# Patient Record
Sex: FEMALE | Race: WHITE | NOT HISPANIC OR LATINO
[De-identification: names, ages, dates, MRNs, and addresses within clinical notes are randomized per-mention and may not be internally consistent; named-entity substitution may affect disease eponyms.]

---

## 2017-10-31 ENCOUNTER — RESULT REVIEW (OUTPATIENT)
Age: 57
End: 2017-10-31

## 2019-02-04 PROBLEM — Z00.00 ENCOUNTER FOR PREVENTIVE HEALTH EXAMINATION: Status: ACTIVE | Noted: 2019-02-04

## 2019-04-01 ENCOUNTER — RECORD ABSTRACTING (OUTPATIENT)
Age: 59
End: 2019-04-01

## 2019-04-01 DIAGNOSIS — Z83.3 FAMILY HISTORY OF DIABETES MELLITUS: ICD-10-CM

## 2019-04-01 DIAGNOSIS — Z82.49 FAMILY HISTORY OF ISCHEMIC HEART DISEASE AND OTHER DISEASES OF THE CIRCULATORY SYSTEM: ICD-10-CM

## 2019-04-01 DIAGNOSIS — Z83.1 FAMILY HISTORY OF OTHER INFECTIOUS AND PARASITIC DISEASES: ICD-10-CM

## 2019-04-01 DIAGNOSIS — Z83.438 FAMILY HISTORY OF OTHER DISORDER OF LIPOPROTEIN METABOLISM AND OTHER LIPIDEMIA: ICD-10-CM

## 2019-04-01 DIAGNOSIS — Z80.1 FAMILY HISTORY OF MALIGNANT NEOPLASM OF TRACHEA, BRONCHUS AND LUNG: ICD-10-CM

## 2019-04-01 LAB — CYTOLOGY CVX/VAG DOC THIN PREP: NORMAL

## 2019-06-25 ENCOUNTER — RESULT REVIEW (OUTPATIENT)
Age: 59
End: 2019-06-25

## 2019-10-31 ENCOUNTER — APPOINTMENT (OUTPATIENT)
Dept: OBGYN | Facility: CLINIC | Age: 59
End: 2019-10-31
Payer: COMMERCIAL

## 2019-10-31 VITALS
SYSTOLIC BLOOD PRESSURE: 117 MMHG | WEIGHT: 151 LBS | HEIGHT: 65 IN | DIASTOLIC BLOOD PRESSURE: 76 MMHG | BODY MASS INDEX: 25.16 KG/M2

## 2019-10-31 PROCEDURE — 99396 PREV VISIT EST AGE 40-64: CPT

## 2019-11-08 ENCOUNTER — TRANSCRIPTION ENCOUNTER (OUTPATIENT)
Age: 59
End: 2019-11-08

## 2020-08-13 ENCOUNTER — RESULT REVIEW (OUTPATIENT)
Age: 60
End: 2020-08-13

## 2020-11-04 ENCOUNTER — APPOINTMENT (OUTPATIENT)
Dept: OBGYN | Facility: CLINIC | Age: 60
End: 2020-11-04
Payer: COMMERCIAL

## 2020-11-04 VITALS
WEIGHT: 160 LBS | BODY MASS INDEX: 26.66 KG/M2 | SYSTOLIC BLOOD PRESSURE: 124 MMHG | HEIGHT: 65 IN | DIASTOLIC BLOOD PRESSURE: 78 MMHG

## 2020-11-04 PROCEDURE — 99072 ADDL SUPL MATRL&STAF TM PHE: CPT

## 2020-11-04 PROCEDURE — 99396 PREV VISIT EST AGE 40-64: CPT

## 2020-11-04 NOTE — HISTORY OF PRESENT ILLNESS
[FreeTextEntry1] : 59yo  postmenopausal without HRT here for annual GYN exam. She is in a stable monogamous relationship. She has h/o genital HSV and takes Valtrex for rare outbreaks(none in past year) .Her daughter is 22 and graduated [employer] in May, now working in [place]. Her son is  and has 2 daughters 3 and 6 years old. She denies any new health history since her last exam. She is osteoporotic at the spine per BMD 2018 and did improve her Vit D level when taking daily 2000U religiously but has since been less diligent about taking it. Has distant h/o HPV/JUAN treated with cone biopsy. Paps have been negative/HPV negative since \par  [Mammogramdate] : 8/13/20 [TextBox_19] : BIRADS 1 [PapSmeardate] : 10/31/17 [TextBox_31] : Neg/HPV Neg [BoneDensityDate] : 6/7/18 [TextBox_37] : T Score Spine -2.6 Hip -0.4 Fem Neck -1.5, [ColonoscopyDate] : 7/9/18 [TextBox_43] : Negative, 7 year recall

## 2020-11-05 LAB — HPV HIGH+LOW RISK DNA PNL CVX: NOT DETECTED

## 2020-11-09 LAB — CYTOLOGY CVX/VAG DOC THIN PREP: ABNORMAL

## 2021-09-16 ENCOUNTER — RESULT REVIEW (OUTPATIENT)
Age: 61
End: 2021-09-16

## 2021-11-08 ENCOUNTER — APPOINTMENT (OUTPATIENT)
Dept: OBGYN | Facility: CLINIC | Age: 61
End: 2021-11-08
Payer: COMMERCIAL

## 2021-11-08 VITALS
WEIGHT: 158 LBS | HEIGHT: 65 IN | SYSTOLIC BLOOD PRESSURE: 126 MMHG | BODY MASS INDEX: 26.33 KG/M2 | DIASTOLIC BLOOD PRESSURE: 78 MMHG

## 2021-11-08 PROCEDURE — 99396 PREV VISIT EST AGE 40-64: CPT

## 2021-11-08 NOTE — HISTORY OF PRESENT ILLNESS
[FreeTextEntry1] : 62yo  postmenopausal without HRT here for annual GYN exam. She is in a stable monogamous relationship. She has h/o genital HSV and takes Valtrex for rare outbreaks(none in past > year) .Her daughter is 23 and graduated [employer], now living/working in [place]( [employer]). Her son is  and has 2 daughters 4 and 7 years old. She denies any new health history since her last exam. She had a fall which injured her knees but thankfully she didn't break anything!  She is osteoporotic at the spine per BMD 2018 and did improve her Vit D level when taking daily 2000U religiously but has since been less diligent about taking it( recent Vitamin D level 27). Has distant h/o HPV/JUAN treated with cone biopsy. Paps have been negative/HPV negative. She received 2 doses Pfizer vaccine anfd will take 3rd when eligible.\par \par \par OB History\par Total pregnancies  2.  Total living children  2.  Full-term delivery  2.  NVD  2 [Mammogramdate] : 9/16/21 [TextBox_19] : BIRADS 1 [BreastSonogramDate] : 9/16/21 [TextBox_25] : BIRADS 1 [PapSmeardate] : 11/4/20 [TextBox_31] : Neg/HPV Neg [BoneDensityDate] : 6/7/18 [TextBox_37] : T Score Spine -2.6 Hip -0.4 Fem Neck -1.5,.  [ColonoscopyDate] : 7/9/18 [TextBox_43] : Negative 7 Y recall

## 2022-01-19 ENCOUNTER — APPOINTMENT (OUTPATIENT)
Dept: GASTROENTEROLOGY | Facility: CLINIC | Age: 62
End: 2022-01-19
Payer: COMMERCIAL

## 2022-01-19 ENCOUNTER — APPOINTMENT (OUTPATIENT)
Dept: NEUROLOGY | Facility: CLINIC | Age: 62
End: 2022-01-19
Payer: COMMERCIAL

## 2022-01-19 VITALS
DIASTOLIC BLOOD PRESSURE: 70 MMHG | WEIGHT: 156 LBS | TEMPERATURE: 97.6 F | HEART RATE: 80 BPM | SYSTOLIC BLOOD PRESSURE: 120 MMHG | BODY MASS INDEX: 25.99 KG/M2 | HEIGHT: 65 IN | OXYGEN SATURATION: 98 %

## 2022-01-19 PROCEDURE — 36415 COLL VENOUS BLD VENIPUNCTURE: CPT

## 2022-01-19 PROCEDURE — 99244 OFF/OP CNSLTJ NEW/EST MOD 40: CPT | Mod: 25

## 2022-01-19 PROCEDURE — 99245 OFF/OP CONSLTJ NEW/EST HI 55: CPT | Mod: GT

## 2022-01-19 RX ORDER — VALACYCLOVIR 1 G/1
1 TABLET, FILM COATED ORAL
Qty: 10 | Refills: 5 | Status: DISCONTINUED | COMMUNITY
Start: 1900-01-01 | End: 2022-01-19

## 2022-01-19 RX ORDER — NEOMYCIN AND POLYMYXIN B SULFATES AND DEXAMETHASONE 3.5; 10000; 1 MG/G; [IU]/G; MG/G
3.5-10000-0.1 OINTMENT OPHTHALMIC
Qty: 3 | Refills: 0 | Status: DISCONTINUED | COMMUNITY
Start: 2021-07-20 | End: 2022-01-19

## 2022-01-19 NOTE — CONSULT LETTER
[Dear  ___] : Dear  [unfilled], [Consult Letter:] : I had the pleasure of evaluating your patient, [unfilled]. [Please see my note below.] : Please see my note below. [Consult Closing:] : Thank you very much for allowing me to participate in the care of this patient.  If you have any questions, please do not hesitate to contact me. [Sincerely,] : Sincerely, [FreeTextEntry3] : Bandar López MD\par tel: [phone]\par fax: [phone]\par

## 2022-01-19 NOTE — HISTORY OF PRESENT ILLNESS
[de-identified] : MICHAELA PADILLA  is being evaluated at the request of Dr. Maldonado for an opinion re: abdominal pain  / diarrhea / BBPPR.  developed sudden onset lower quadrant   / severe abdominal pain on 1/15 folloed by non bloody diarrhea and  eventually bnloody diarrhea x 3.  Sxs last 24 hours. Currently without  diarrhea / BRBPR. Abdominal pain is  significantly decreased. Denies fevr, chills, melena, hematemesis. Admits to 1  episode of  bilious  emesis\par \par \par - Colonoscopy  7/2018 notable  for hemorrhoids / diverticulosis

## 2022-01-19 NOTE — ASSESSMENT
[FreeTextEntry1] : Probable  self limited ischemic  colitis:  Improved. Labs  ordered.  CAT scan ordered.  If  CT scan abnormal  or if  sxs return , will perform colonoscopy sooner than planned\par \par Pertinent available records reviewed\par \par The ordered labs/ bloods  were drawn / collected in my office\par \par

## 2022-01-20 ENCOUNTER — RESULT REVIEW (OUTPATIENT)
Age: 62
End: 2022-01-20

## 2022-01-20 LAB
ALBUMIN SERPL ELPH-MCNC: 4.5 G/DL
ALP BLD-CCNC: 85 U/L
ALT SERPL-CCNC: 34 U/L
AMYLASE/CREAT SERPL: 38 U/L
ANION GAP SERPL CALC-SCNC: 12 MMOL/L
AST SERPL-CCNC: 25 U/L
BASOPHILS # BLD AUTO: 0.05 K/UL
BASOPHILS NFR BLD AUTO: 0.6 %
BILIRUB DIRECT SERPL-MCNC: 0.2 MG/DL
BILIRUB INDIRECT SERPL-MCNC: 0.5 MG/DL
BILIRUB SERPL-MCNC: 0.7 MG/DL
BUN SERPL-MCNC: 13 MG/DL
CALCIUM SERPL-MCNC: 9.6 MG/DL
CHLORIDE SERPL-SCNC: 96 MMOL/L
CO2 SERPL-SCNC: 24 MMOL/L
CREAT SERPL-MCNC: 0.95 MG/DL
CRP SERPL-MCNC: 19 MG/L
EOSINOPHIL # BLD AUTO: 0.14 K/UL
EOSINOPHIL NFR BLD AUTO: 1.7 %
ERYTHROCYTE [SEDIMENTATION RATE] IN BLOOD BY WESTERGREN METHOD: 34 MM/HR
GLUCOSE SERPL-MCNC: 113 MG/DL
HCT VFR BLD CALC: 43.8 %
HGB BLD-MCNC: 14.6 G/DL
IMM GRANULOCYTES NFR BLD AUTO: 0.4 %
LPL SERPL-CCNC: 29 U/L
LYMPHOCYTES # BLD AUTO: 2.05 K/UL
LYMPHOCYTES NFR BLD AUTO: 24.6 %
MAN DIFF?: NORMAL
MCHC RBC-ENTMCNC: 31.1 PG
MCHC RBC-ENTMCNC: 33.3 GM/DL
MCV RBC AUTO: 93.4 FL
MONOCYTES # BLD AUTO: 0.68 K/UL
MONOCYTES NFR BLD AUTO: 8.2 %
NEUTROPHILS # BLD AUTO: 5.39 K/UL
NEUTROPHILS NFR BLD AUTO: 64.5 %
PLATELET # BLD AUTO: 242 K/UL
POTASSIUM SERPL-SCNC: 4.2 MMOL/L
PROT SERPL-MCNC: 7.7 G/DL
RBC # BLD: 4.69 M/UL
RBC # FLD: 12.7 %
SODIUM SERPL-SCNC: 133 MMOL/L
WBC # FLD AUTO: 8.34 K/UL

## 2022-01-20 RX ORDER — DOXYCYCLINE 150 MG/1
CAPSULE ORAL
Refills: 0 | Status: DISCONTINUED | COMMUNITY
End: 2022-01-20

## 2022-01-20 RX ORDER — DOXYCYCLINE HYCLATE 100 MG/1
100 TABLET ORAL
Qty: 30 | Refills: 0 | Status: DISCONTINUED | COMMUNITY
Start: 2020-11-20 | End: 2022-01-20

## 2022-01-20 NOTE — ASSESSMENT
[FreeTextEntry1] : She needs imaging as outlined below.\par MRI brain and IACs- for persistent balance problems and tinnitus.\par MRI cervical spine to evaluate for cord compression as a possible etiology of her symptoms.\par Paresthesias need to be evaluated via EMG/NCV (one arm, one leg).\par \par I would like her to get VNG and Vestibular therapy. consider ENT evaluation.\par \par Recommend daily walking 30 minutes at least.\par Hydration 64 ounces. \par \par Will hold off further blood work until we receive the most recent one.\par \par Further recommendations based on test results.

## 2022-01-20 NOTE — HISTORY OF PRESENT ILLNESS
[Home] : at home, [unfilled] , at the time of the visit. [Medical Office: ([facility])___] : at the medical office located in  [Verbal consent obtained from patient] : the patient, [unfilled] [FreeTextEntry1] : Dary is a 62 y/o woan who is being seen in neurologic consultation for balance problems. Ongoing since 2013.\par She notes symptoms when she lays down at night. If she sits up too fast and turns her head from sided o side- she feels off balance. The room doesn't spin. She notes sensation to be as if she is on a boat or the subway- its a sensation of her swaying- this leads her to lose confidence in her walking aility.\par \par She notes high pitched ringing in her left ear. Feels a sensation of "hair on the cheek." Saw Dr. Scott in 2015 and had hearing test and noted to have hearing loss in left ear. MRI was normal at that time. She now notes dimas pitched ringing in her left ear.\par \par Notes bilateral feet tingling. Notes numbness in the feet. Calves cramp especially in the evening.\par \par History of ocular migraines.\par Now notes delayed opening only in the left eye.\par Note exercising well.\par Feels diet is healthy.\par \par Recent bw was normal except for low vitamin D. TG were elevated.\par \par +Neck pain, no weakness; no  no urinary complaints. X-ray is normal.

## 2022-01-20 NOTE — PHYSICAL EXAM
[FreeTextEntry1] : Physical examination \par General: No acute distress, Awake, Alert. \par \par Mental status \par Awake, alert, and oriented to person, time and place, Normal attention span and concentration, Recent and remote memory intact, Language intact, Fund of knowledge intact. \par Cranial Nerves \par II: VFF \par III, IV, VI: PERRL, EOMI. \par V: Facial sensation is normal B/L. \par VII: Facial strength is normal B/L. \par VIII: Gross hearing is intact. \par \par Motor exam \par normal\par \par Gait \par Normal; romberg positive.\par

## 2022-02-04 ENCOUNTER — NON-APPOINTMENT (OUTPATIENT)
Age: 62
End: 2022-02-04

## 2022-06-17 ENCOUNTER — APPOINTMENT (OUTPATIENT)
Dept: CARDIOLOGY | Facility: CLINIC | Age: 62
End: 2022-06-17

## 2022-06-28 ENCOUNTER — RESULT REVIEW (OUTPATIENT)
Age: 62
End: 2022-06-28

## 2022-07-21 ENCOUNTER — APPOINTMENT (OUTPATIENT)
Dept: NEUROLOGY | Facility: CLINIC | Age: 62
End: 2022-07-21

## 2022-07-21 PROCEDURE — 99214 OFFICE O/P EST MOD 30 MIN: CPT | Mod: 95

## 2022-07-21 NOTE — DATA REVIEWED
[de-identified] : \par  MRI Report             Final\par \par No Documents Attached\par \par \par \par \par   [facility]\par                                          [address]\par                                    [place], [place]  [address]\par                                        Department of Radiology\par                                             [phone]\par \par \par Patient Name:      MICHAELA PADILLA                Location:       [facility]\par Med Rec #:        [id number]                    Account #:      [id number]\par YOB: 1960                    Ordering:       Ebony Muhammad MD\par Age: 62               Sex:    F                 Attending:      Ebony Muhammad MD\par PCP:        Liz Mar MD\par ______________________________________________________________________________________\par \par Exam Date:      06/28/22\par Exam:         MRI BRAIN\par Order#:       MRI [id number]\par \par \par \par EXAM: MRI brain without contrast\par \par  INDICATION: Nonspecific neurologic symptoms. Tinnitus. Hearing loss\par \par  TECHNIQUE: MR examination of brain performed without contrast with special attention to the\par internal auditory canals (IAC) utilizing axial diffusion-weighted/ADC, axial T2 FLAIR, sagittal T2\par FLAIR, coronal T2 FLAIR, axial T1, sagittal T1, coronal T1, axial, axial/coronal T1 of IAC,\par axial/coronal T2 IAC, susceptibility weighted sequences.\par \par  Please note that although this exam was originally ordered with contrast, the patient requested no\par contrast for this exam\par \par  COMPARISON: March 25, 2015 MRI brain\par \par  FINDINGS:\par \par  Ventricles and sulci are mildly proportionately prominent likely related to mild parenchymal volume\par loss.  . No hydrocephalus or extra-axial fluid collection.\par \par  No abnormal restricted diffusion identified to suggest acute territorial infarction. No acute\par intracranial hemorrhage. Mild subcortical and periventricular-white matter T2-weighted\par hyperintensity, nonspecific but favored to reflect sequela of chronic mild microvascular\par ischemia.etiologies. No significant midline shift, mass effect or herniation. Susceptibility\par weighted sequences are within normal limits without evidence for chronic blood products.\par  Visualized proximal arterial and dural venous sinus flow voids preserved on spin-echo sequences.\par Mastoid air cells are well aerated.. Moderate severe mucosal thickening of anterior ethmoid air\par cells, sphenoid and maxillary sinuses with air fluid level present within right maxillary sinus.\par \par  No suspicious expansile or destructive calvarial lesion identified.\par \par  Sella and suprasellar region are unremarkable.\par \par  No space-occupying lesion, abnormal signal identified within the internal auditory canals and\par bilateral cerebellopontine angle cisterns.\par  Inner ear structures demonstrate normal T2-weighted signal bilaterally. Meckel caves and cavernous\par sinuses are within normal limits.\par \par \par \par  IMPRESSION:\par \par  No acute intracranial hemorrhage, acute infarction, extra-axial fluid collection or hydrocephalus.\par  No space-occupying lesion, no abnormal signal identified within the internal auditory canals and\par bilateral cerebellopontine angle cisterns.\par \par  If there are questions/need for clarification regarding this report, Dr. Gilberto Jose can be best\par reached via the patient secure hospital email system at [email or web address].\par \par  --- End of Report ---\par \par ***Electronically Signed ***\par -----------------------------------------------\par Gilberto Almonte MD              06/29/22 0523\par \par Dictated on 06/29/22\par \par \par Report cc:  Ebony Muhammad MD;\par \par  \par \par  Ordered by: EBONY MUHAMMAD       Collected/Examined: 28Jun2022 01:04PM       \par Verified by: EBONY MUHAMMAD 29Jun2022 10:26AM       \par  Result Communication: No patient communication needed at this time;\par Stage: Final       \par  Performed at: [facility]       Resulted: [id number] 05:23AM       Last Updated: 29Jun2022 10:26AM       Accession: [id number]        [de-identified] : \par  MRI Report             Final\par \par No Documents Attached\par \par \par \par \par   [facility]\par                                          [address]\par                                    [place], [place]  [address]\par                                        Department of Radiology\par                                             [phone]\par \par \par Patient Name:      MICHAELA PADILLA                Location:       [facility]\par Med Rec #:        [id number]                    Account #:      [id number]\par YOB: 1960                    Ordering:       Ebony Muhammad MD\par Age: 62               Sex:    F                 Attending:      Ebony Muhammad MD\par PCP:        Liz Mar MD\par ______________________________________________________________________________________\par \par Exam Date:      06/28/22\par Exam:         MRI CERVICAL SPINE\par Order#:       MRI [id number]\par \par \par \par EXAM: MR cervical spine without contrast\par \par  INDICATION: Neck pain.\par \par  TECHNIQUE: MR examination of cervical spine performed without contrast utilizing sagittal T2,\par sagittal T1, sagittal T2 STIR, axial T1, axial T2 and axial gradient echo sequences.\par \par  COMPARISON: None available\par \par  FINDINGS:\par \par  Reversal of cervical lordosis centered at C5-C6 which may be secondary to positioning or spasm.\par Cervical vertebral body heights are maintained. No significant spondylolisthesis.. Cervical vertebr\par al body bone marrow signal within normal limits. There are multilevel degenerative endplate changes\par with osteophyte formation, intervertebral disc space narrowing/desiccation, Schmorl's nodes and\par endplate irregularity. . No suspicious expansile or destructive osseous lesion identified.\par Paraspinal soft tissues are within normal limits. Visualized lung apices are unremarkable.\par Visualized vertebral artery flow voids are preserved. No abnormal cord signal identified. No\par significant periarticular or paraspinal soft tissue edema identified. Partially imaged moderate\par opacification of the sphenoid sinus\par \par  There are multilevel findings as follows:\par \par \par \par  Craniocervical junction unremarkable.\par \par  C2-C3: No significant canal or foraminal stenosis.\par \par  C3-C4: No significant canal or foraminal stenosis\par \par  C4-C5: Posterior osteophytic ridging with disc bulging and bilateral uncovertebral/facet\par hypertrophy contributing to mild canal stenosis and mild bilateral foraminal stenosis.\par \par  C5-C6: Posterior osteophytic ridging with disc bulging and bilateral uncovertebral/facet\par hypertrophy contributing to mild canal stenosis and moderate to severe bilateral foraminal stenosis,\par left greater than right.\par \par  C6-C7: Posterior osteophytic ridging with disc bulging and bilateral uncovertebral/facet\par hypertrophy contributing to no significant canal stenosis and mild bilateral foraminal stenosis.\par \par  C7-T1: No significant canal or foraminal stenosis.\par \par \par \par \par \par  IMPRESSION:\par \par  Multilevel cervical spondylitic changes as detailed above most notable at the C5-6 level where\par there is mild canal stenosis and moderate to severe bilateral foraminal stenosis, left greater than\par right\par \par  If there are questions/need for clarification regarding this report, Dr. Gilberto Jose can be best\par reached via the patient secure hospital [employer] system at [email or web address].\par \par  --- End of Report ---\par \par ***Electronically Signed ***\par -----------------------------------------------\par Gilberto Almonte MD              06/29/22 0539\par \par Dictated on 06/29/22\par \par \par Report cc:  Ebony Muhammad MD;\par \par  \par \par  Ordered by: EBONY MUHAMMAD       Collected/Examined: 28Jun2022 01:02PM       \par Verified by: EBONY MUHAMMAD 29Jun2022 10:26AM       \par  Result Communication: No patient communication needed at this time;\par Stage: Final       \par  Performed at: [facility]       Resulted: 29Jun2022 05:39AM       Last Updated: 29Jun2022 10:26AM       Accession: [id number]

## 2022-07-21 NOTE — HISTORY OF PRESENT ILLNESS
[Home] : at home, [unfilled] , at the time of the visit. [Medical Office: ([facility])___] : at the medical office located in  [Verbal consent obtained from patient] : the patient, [unfilled] [FreeTextEntry1] : Patient is doing a telemetry follow-up.  Since last visit she underwent a lots of health issues.\par Developed ischemic colitis-in January.\par She did see ENT Dr. Carmichael - hearing test - asym hearing loss- sl increased. tinnitus left ear\par She had COVID also.  \par \par At present, she does continue to have feelings of being off balance.\par She has neck stiffness.\par \par 1/19/22\par Dary is a 62 y/o woman who is being seen in neurologic consultation for balance problems. Ongoing since 2013.\par She notes symptoms when she lays down at night. If she sits up too fast and turns her head from sided o side- she feels off balance. The room doesn't spin. She notes sensation to be as if she is on a boat or the subway- its a sensation of her swaying- this leads her to lose confidence in her walking ability.\par \par She notes high pitched ringing in her left ear. Feels a sensation of "hair on the cheek." Saw Dr. Scott in 2015 and had hearing test and noted to have hearing loss in left ear. MRI was normal at that time. She now notes high pitched ringing in her left ear.\par \par Notes bilateral feet tingling. Notes numbness in the feet. Calves cramp especially in the evening.\par \par History of ocular migraines.\par Now notes delayed opening only in the left eye.\par Note exercising well.\par Feels diet is healthy.\par \par Recent bw was normal except for low vitamin D. TG were elevated.\par \par +Neck pain, no weakness; no  no urinary complaints. X-ray is normal.

## 2022-07-21 NOTE — ASSESSMENT
[FreeTextEntry1] : Reviewed imaging which included MRI of the brain and cervical spine.\par Encouraged her to begin walking.\par Maintain hydration.\par \par Recommend balance therapy and therapy for the neck pain which is secondary to degenerative changes.\par \par She prefers to hold off on the EMG/NCV at the moment.\par \par

## 2022-07-21 NOTE — PHYSICAL EXAM
[FreeTextEntry1] : Physical examination \par General: No acute distress, Awake, Alert. \par \par Mental status \par Awake, alert, and oriented to person, time and place, Normal attention span and concentration, Recent and remote memory intact, Language intact, Fund of knowledge intact. \par Cranial Nerves \par II: VFF \par III, IV, VI: PERRL, EOMI. \par V: Facial sensation is normal B/L. \par VII: Facial strength is normal B/L. \par VIII: Gross hearing is intact. \par \par Motor exam \par normal\par

## 2022-09-12 ENCOUNTER — RESULT REVIEW (OUTPATIENT)
Age: 62
End: 2022-09-12

## 2022-09-20 ENCOUNTER — RESULT REVIEW (OUTPATIENT)
Age: 62
End: 2022-09-20

## 2022-11-02 ENCOUNTER — APPOINTMENT (OUTPATIENT)
Dept: NEUROLOGY | Facility: CLINIC | Age: 62
End: 2022-11-02

## 2022-11-09 ENCOUNTER — APPOINTMENT (OUTPATIENT)
Dept: OBGYN | Facility: CLINIC | Age: 62
End: 2022-11-09

## 2022-11-09 VITALS — WEIGHT: 158 LBS | BODY MASS INDEX: 26.33 KG/M2 | HEIGHT: 65 IN

## 2022-11-10 ENCOUNTER — APPOINTMENT (OUTPATIENT)
Dept: OBGYN | Facility: CLINIC | Age: 62
End: 2022-11-10

## 2022-11-10 VITALS — WEIGHT: 158 LBS | HEIGHT: 65 IN | BODY MASS INDEX: 26.33 KG/M2

## 2022-11-10 PROCEDURE — 99396 PREV VISIT EST AGE 40-64: CPT

## 2022-11-10 RX ORDER — COVID-19 MOLECULAR TEST ASSAY
KIT MISCELLANEOUS
Qty: 4 | Refills: 0 | Status: COMPLETED | COMMUNITY
Start: 2022-06-30

## 2022-11-10 NOTE — HISTORY OF PRESENT ILLNESS
[FreeTextEntry1] : 61yo  postmenopausal without HRT here for annual GYN exam. She is in a stable monogamous relationship. She has h/o genital HSV and takes Valtrex for rare outbreaks(none in past > year) .Her daughter is 24 and graduated [employer], still living/working in [place]( [employer]). Her son is  and has 2 daughters 5 and 8 years old. She had an episode of severe colitis in January, saw Dr. López but no etiology identified. She is osteoporotic at the spine per BMD   with a slight decrease on BMD . She did improve her Vit D level  taking daily 2000U religiously(32). She has an appointment with Dr. Rand in February for treatment consultation. Has distant h/o HPV/JUAN treated with cone biopsy. Paps have been negative/HPV negative.  [Mammogramdate] : 9/20/22 [TextBox_19] : BIRADS 1D [BreastSonogramDate] : 9/20/22 [TextBox_25] : BIRADS 1D [PapSmeardate] : 11/4/20 [TextBox_31] : Neg/HPV Neg [BoneDensityDate] : 9/12/22 [TextBox_37] : T Score Spine -3.0 Hip @ Fem Neck -1.7 [ColonoscopyDate] : 7/9/18 [TextBox_43] : Negative 7 year recall

## 2023-02-01 ENCOUNTER — NON-APPOINTMENT (OUTPATIENT)
Age: 63
End: 2023-02-01

## 2023-02-01 DIAGNOSIS — H91.92 UNSPECIFIED HEARING LOSS, LEFT EAR: ICD-10-CM

## 2023-02-01 DIAGNOSIS — K57.90 DIVERTICULOSIS OF INTESTINE, PART UNSPECIFIED, W/OUT PERFORATION OR ABSCESS W/OUT BLEEDING: ICD-10-CM

## 2023-02-01 DIAGNOSIS — M54.2 CERVICALGIA: ICD-10-CM

## 2023-02-01 DIAGNOSIS — H93.19 TINNITUS, UNSPECIFIED EAR: ICD-10-CM

## 2023-02-01 DIAGNOSIS — G89.29 CERVICALGIA: ICD-10-CM

## 2023-02-01 DIAGNOSIS — L71.9 ROSACEA, UNSPECIFIED: ICD-10-CM

## 2023-02-01 DIAGNOSIS — R26.89 OTHER ABNORMALITIES OF GAIT AND MOBILITY: ICD-10-CM

## 2023-02-01 DIAGNOSIS — R92.2 INCONCLUSIVE MAMMOGRAM: ICD-10-CM

## 2023-02-01 DIAGNOSIS — K64.8 OTHER HEMORRHOIDS: ICD-10-CM

## 2023-02-02 ENCOUNTER — NON-APPOINTMENT (OUTPATIENT)
Age: 63
End: 2023-02-02

## 2023-02-02 ENCOUNTER — APPOINTMENT (OUTPATIENT)
Dept: CARDIOLOGY | Facility: CLINIC | Age: 63
End: 2023-02-02
Payer: COMMERCIAL

## 2023-02-02 VITALS
SYSTOLIC BLOOD PRESSURE: 130 MMHG | BODY MASS INDEX: 26.33 KG/M2 | HEIGHT: 65 IN | WEIGHT: 158 LBS | DIASTOLIC BLOOD PRESSURE: 82 MMHG

## 2023-02-02 PROCEDURE — 93000 ELECTROCARDIOGRAM COMPLETE: CPT

## 2023-02-02 PROCEDURE — 99205 OFFICE O/P NEW HI 60 MIN: CPT | Mod: 25

## 2023-02-02 NOTE — HISTORY OF PRESENT ILLNESS
[FreeTextEntry1] : Ms Crowe was first seen in 2002 for palpitations, has not been seen since 2018..\par \par She has not been hospitalized since 2018, however she was treated for colitis, vertigo, asymmetric hearing loss.\par \par She gets a pain under her left breast, radiates around to her back usually postprandial relieved by burping about once a month.\par She denies dyspnea.\par She has palpitations several times a week, like a flutter or delay.\par She denies syncope. She felt presyncopal in December. \par \par She is active in daily life, she works from home for [employer].

## 2023-02-14 ENCOUNTER — APPOINTMENT (OUTPATIENT)
Dept: ENDOCRINOLOGY | Facility: CLINIC | Age: 63
End: 2023-02-14
Payer: COMMERCIAL

## 2023-02-14 VITALS
SYSTOLIC BLOOD PRESSURE: 130 MMHG | DIASTOLIC BLOOD PRESSURE: 82 MMHG | HEART RATE: 76 BPM | WEIGHT: 158 LBS | BODY MASS INDEX: 24.8 KG/M2 | OXYGEN SATURATION: 99 % | HEIGHT: 67 IN

## 2023-02-14 DIAGNOSIS — M81.0 AGE-RELATED OSTEOPOROSIS W/OUT CURRENT PATHOLOGICAL FRACTURE: ICD-10-CM

## 2023-02-14 PROCEDURE — 99204 OFFICE O/P NEW MOD 45 MIN: CPT

## 2023-02-14 PROCEDURE — G0444 DEPRESSION SCREEN ANNUAL: CPT

## 2023-02-22 ENCOUNTER — APPOINTMENT (OUTPATIENT)
Dept: CARDIOLOGY | Facility: CLINIC | Age: 63
End: 2023-02-22
Payer: COMMERCIAL

## 2023-02-22 ENCOUNTER — TRANSCRIPTION ENCOUNTER (OUTPATIENT)
Age: 63
End: 2023-02-22

## 2023-02-22 PROCEDURE — 36415 COLL VENOUS BLD VENIPUNCTURE: CPT

## 2023-02-22 PROCEDURE — 93306 TTE W/DOPPLER COMPLETE: CPT

## 2023-02-23 LAB
ANION GAP SERPL CALC-SCNC: 12 MMOL/L
BUN SERPL-MCNC: 16 MG/DL
CALCIUM SERPL-MCNC: 9.6 MG/DL
CHLORIDE SERPL-SCNC: 101 MMOL/L
CO2 SERPL-SCNC: 26 MMOL/L
CREAT SERPL-MCNC: 0.77 MG/DL
EGFR: 87 ML/MIN/1.73M2
GLUCOSE SERPL-MCNC: 79 MG/DL
POTASSIUM SERPL-SCNC: 4.5 MMOL/L
SODIUM SERPL-SCNC: 139 MMOL/L

## 2023-02-27 ENCOUNTER — RESULT REVIEW (OUTPATIENT)
Age: 63
End: 2023-02-27

## 2023-03-01 ENCOUNTER — APPOINTMENT (OUTPATIENT)
Dept: CARDIOLOGY | Facility: CLINIC | Age: 63
End: 2023-03-01
Payer: COMMERCIAL

## 2023-03-01 PROCEDURE — 93248 EXT ECG>7D<15D REV&INTERPJ: CPT

## 2023-03-01 PROCEDURE — 99442: CPT

## 2023-03-01 RX ORDER — ATENOLOL 25 MG/1
25 TABLET ORAL DAILY
Refills: 0 | Status: DISCONTINUED | COMMUNITY
End: 2023-03-01

## 2023-03-02 ENCOUNTER — APPOINTMENT (OUTPATIENT)
Dept: CARDIOLOGY | Facility: CLINIC | Age: 63
End: 2023-03-02
Payer: COMMERCIAL

## 2023-03-02 PROCEDURE — 99442: CPT

## 2023-03-05 ENCOUNTER — RESULT REVIEW (OUTPATIENT)
Age: 63
End: 2023-03-05

## 2023-03-06 ENCOUNTER — TRANSCRIPTION ENCOUNTER (OUTPATIENT)
Age: 63
End: 2023-03-06

## 2023-03-16 ENCOUNTER — APPOINTMENT (OUTPATIENT)
Dept: CARDIOLOGY | Facility: CLINIC | Age: 63
End: 2023-03-16
Payer: COMMERCIAL

## 2023-03-16 ENCOUNTER — NON-APPOINTMENT (OUTPATIENT)
Age: 63
End: 2023-03-16

## 2023-03-16 VITALS
TEMPERATURE: 97.7 F | DIASTOLIC BLOOD PRESSURE: 80 MMHG | BODY MASS INDEX: 24.8 KG/M2 | SYSTOLIC BLOOD PRESSURE: 130 MMHG | OXYGEN SATURATION: 99 % | WEIGHT: 158 LBS | HEIGHT: 67 IN | HEART RATE: 81 BPM

## 2023-03-16 VITALS — SYSTOLIC BLOOD PRESSURE: 132 MMHG | DIASTOLIC BLOOD PRESSURE: 80 MMHG

## 2023-03-16 PROCEDURE — 99214 OFFICE O/P EST MOD 30 MIN: CPT | Mod: 25

## 2023-03-16 PROCEDURE — 93246 EXT ECG>7D<15D RECORDING: CPT

## 2023-03-16 PROCEDURE — 93000 ELECTROCARDIOGRAM COMPLETE: CPT | Mod: 59

## 2023-03-16 RX ORDER — ROSUVASTATIN CALCIUM 10 MG/1
10 TABLET, FILM COATED ORAL DAILY
Qty: 90 | Refills: 3 | Status: DISCONTINUED | COMMUNITY
Start: 2023-03-01 | End: 2023-03-16

## 2023-03-16 NOTE — REASON FOR VISIT
[FreeTextEntry1] : Zio 2/2-2/16/23 Sinus. 5 pauses, longest 3.6 seconds. High degree Av block. Atenolol was discontinued. \par \par Dary Crowe presents for follow up visit and application of 14 day event monitor while off atenolol. \par \par She reports feeling well. Ms. Crowe denies chest pain, SOB, palpitations, dizziness or syncope.

## 2023-03-16 NOTE — CARDIOLOGY SUMMARY
[de-identified] : 2/1/23-nsr [de-identified] : 2018- negative [de-identified] : 2/22/23 LVEF 63%. Mild MR. Mild AI. [de-identified] : 2/28/23 Calcium score zero [de-identified] : Telly 2/2-2/16/23 Sinus. 5 pauses, longest 3.6 seconds. High degree Av block [de-identified] : Carotid doppler 3/6/23 Normal

## 2023-03-16 NOTE — DISCUSSION/SUMMARY
[Patient] : the patient [FreeTextEntry3] : to be determined after Zio. She has an appointment with Dr. Pang on 2/7/2024 [EKG obtained to assist in diagnosis and management of assessed problem(s)] : EKG obtained to assist in diagnosis and management of assessed problem(s)

## 2023-03-16 NOTE — REVIEW OF SYSTEMS
[Fever] : no fever [Headache] : no headache [Chills] : no chills [Feeling Fatigued] : not feeling fatigued [SOB] : no shortness of breath [Dyspnea on exertion] : not dyspnea during exertion [Chest Discomfort] : no chest discomfort [Lower Ext Edema] : no extremity edema [Palpitations] : no palpitations [Syncope] : no syncope [Cough] : no cough [Dizziness] : no dizziness [Confusion] : no confusion was observed [Easy Bruising] : no tendency for easy bruising

## 2023-03-26 PROBLEM — M81.0 OSTEOPOROSIS: Status: ACTIVE | Noted: 2019-04-01

## 2023-03-26 NOTE — CONSULT LETTER
[Dear  ___] : Dear  [unfilled], [Consult Letter:] : I had the pleasure of evaluating your patient, [unfilled]. [Please see my note below.] : Please see my note below. [Sincerely,] : Sincerely, [FreeTextEntry3] : Sincerely,\par \par KIRSTEN PASCUAL MD\par [facility]\par [facility]\par \par

## 2023-03-26 NOTE — REVIEW OF SYSTEMS
[Fatigue] : fatigue [Palpitations] : palpitations [Negative] : Heme/Lymph [FreeTextEntry5] : was told PVC has FHX early CAD 64 mother

## 2023-03-26 NOTE — HISTORY OF PRESENT ILLNESS
[Vitamin D (oral)] : Vitamin D orally [Regular Dental Follow-Up] : regular dental follow-up [Family History of Breast Cancer] : family history of breast cancer [Family History of Hip Fracture] : family history of hip fracture [Family History of Osteoporosis] : family history of osteoporosis [History of Blood Clots] : history of blood clots [High Fall Risk] : high fall risk [FreeTextEntry1] : Ms. MICHAELA PADILLA is a 63 year old female sent in a consult by Dr Pang for osteoporosis \par \par maternal cousin hyperparathyroid surgery  [Low Calcium Intake] : no low calcium intake [Low Vit D Intake/Exposure] : no low vitamin D intake [Premat. Menopause (natural)] : no history of premature menopause [Amenorrhea] : no amenorrhea [Disordered Eating] : no history of disordered eating [Taking Steroids] : no history of taking steroids [Hyperparathyroidism] : no history of hyperparathyroidism [Diabetes] : no history of diabetes [Kidney Stones] : no history of kidney stones [Excessive Alcohol Intake] : no excessive alcohol intake [Excessive Soda] : no excessive soda [Current Smoking___(ppd)] : not currently smoking [Previous Fragility Fracture] : no previous fragility fracture [History of Radiation Therapy] : no history of radiation therapy [Frequent Falls] : no frequent falls [Uses a Walker/Cane] : no use of a walker/cane [Loss of Height] : no loss of height

## 2023-03-26 NOTE — ASSESSMENT
[0] : 2) Feeling down, depressed, or hopeless: Not at all (0) [PHQ-2 Negative - No further assessment needed] : PHQ-2 Negative - No further assessment needed [VHC4Eitgx] : 0

## 2023-04-10 ENCOUNTER — NON-APPOINTMENT (OUTPATIENT)
Age: 63
End: 2023-04-10

## 2023-04-10 ENCOUNTER — APPOINTMENT (OUTPATIENT)
Dept: CARDIOLOGY | Facility: CLINIC | Age: 63
End: 2023-04-10
Payer: COMMERCIAL

## 2023-04-10 VITALS — SYSTOLIC BLOOD PRESSURE: 190 MMHG | DIASTOLIC BLOOD PRESSURE: 86 MMHG

## 2023-04-10 VITALS
HEIGHT: 67 IN | BODY MASS INDEX: 25.58 KG/M2 | SYSTOLIC BLOOD PRESSURE: 158 MMHG | DIASTOLIC BLOOD PRESSURE: 82 MMHG | WEIGHT: 163 LBS

## 2023-04-10 PROCEDURE — 99058 OFFICE EMERGENCY CARE: CPT

## 2023-04-10 PROCEDURE — 99215 OFFICE O/P EST HI 40 MIN: CPT | Mod: 25

## 2023-04-10 PROCEDURE — 93000 ELECTROCARDIOGRAM COMPLETE: CPT

## 2023-04-10 NOTE — CARDIOLOGY SUMMARY
[de-identified] : 4/10/23-nsr [de-identified] : 2018- negative [de-identified] : 2/22/23- ef 63%, mild mr, mild ai [de-identified] : 2/27/23- CTA-zero, minimal mid ald [de-identified] : 3/6/23- no plaque

## 2023-04-10 NOTE — HISTORY OF PRESENT ILLNESS
[FreeTextEntry1] : Ms Crowe was first seen in 2002 for palpitations, has not been seen since 2018..\par \par She has not been hospitalized since 2018, however she was treated for colitis, vertigo, asymmetric hearing loss.\par \par She denies chest pain, dyspnea, palpitations or syncope. Sometimes she feels that cannot get a deep enough breath. Her palpitations are daily but decreased.\par \par Urgent visit today:\par This morning while at rest, she felt "the room flipped upside down" and she felt momentary tunnel vision, lasted 3-4 seconds.  This is now occurring with change in position worse when supine.\par \par She is active in daily life, she works from home for [employer].

## 2023-04-15 ENCOUNTER — NON-APPOINTMENT (OUTPATIENT)
Age: 63
End: 2023-04-15

## 2023-06-27 ENCOUNTER — APPOINTMENT (OUTPATIENT)
Dept: CARDIOLOGY | Facility: CLINIC | Age: 63
End: 2023-06-27

## 2023-08-28 ENCOUNTER — APPOINTMENT (OUTPATIENT)
Dept: ENDOCRINOLOGY | Facility: CLINIC | Age: 63
End: 2023-08-28

## 2023-09-21 ENCOUNTER — RESULT REVIEW (OUTPATIENT)
Age: 63
End: 2023-09-21

## 2023-09-26 ENCOUNTER — LABORATORY RESULT (OUTPATIENT)
Age: 63
End: 2023-09-26

## 2023-09-26 ENCOUNTER — NON-APPOINTMENT (OUTPATIENT)
Age: 63
End: 2023-09-26

## 2023-09-26 ENCOUNTER — APPOINTMENT (OUTPATIENT)
Dept: CARDIOLOGY | Facility: CLINIC | Age: 63
End: 2023-09-26
Payer: COMMERCIAL

## 2023-09-26 VITALS
HEART RATE: 88 BPM | SYSTOLIC BLOOD PRESSURE: 125 MMHG | WEIGHT: 154.56 LBS | BODY MASS INDEX: 24.26 KG/M2 | HEIGHT: 67 IN | OXYGEN SATURATION: 100 % | DIASTOLIC BLOOD PRESSURE: 80 MMHG

## 2023-09-26 VITALS — SYSTOLIC BLOOD PRESSURE: 120 MMHG | HEART RATE: 84 BPM | DIASTOLIC BLOOD PRESSURE: 80 MMHG

## 2023-09-26 PROCEDURE — 93000 ELECTROCARDIOGRAM COMPLETE: CPT | Mod: 59

## 2023-09-26 PROCEDURE — 99214 OFFICE O/P EST MOD 30 MIN: CPT | Mod: 25

## 2023-09-26 PROCEDURE — 93246 EXT ECG>7D<15D RECORDING: CPT

## 2023-09-26 RX ORDER — ADHESIVE TAPE 3"X 2.3 YD
50 MCG TAPE, NON-MEDICATED TOPICAL
Qty: 90 | Refills: 3 | Status: ACTIVE | COMMUNITY
Start: 2023-09-26

## 2023-09-27 LAB
ALBUMIN SERPL ELPH-MCNC: 4.6 G/DL
ALP BLD-CCNC: 113 U/L
ALT SERPL-CCNC: 32 U/L
AST SERPL-CCNC: 29 U/L
BILIRUB DIRECT SERPL-MCNC: 0.2 MG/DL
BILIRUB INDIRECT SERPL-MCNC: 0.6 MG/DL
BILIRUB SERPL-MCNC: 0.8 MG/DL
CHOLEST SERPL-MCNC: 155 MG/DL
HDLC SERPL-MCNC: 54 MG/DL
LDLC SERPL CALC-MCNC: 85 MG/DL
NONHDLC SERPL-MCNC: 101 MG/DL
PROT SERPL-MCNC: 7.7 G/DL
TRIGL SERPL-MCNC: 84 MG/DL

## 2023-09-28 ENCOUNTER — TRANSCRIPTION ENCOUNTER (OUTPATIENT)
Age: 63
End: 2023-09-28

## 2023-10-26 PROCEDURE — 93248 EXT ECG>7D<15D REV&INTERPJ: CPT

## 2023-11-15 ENCOUNTER — APPOINTMENT (OUTPATIENT)
Dept: OBGYN | Facility: CLINIC | Age: 63
End: 2023-11-15
Payer: COMMERCIAL

## 2023-11-15 VITALS — WEIGHT: 155 LBS | HEIGHT: 67 IN | BODY MASS INDEX: 24.33 KG/M2

## 2023-11-15 DIAGNOSIS — Z86.19 PERSONAL HISTORY OF OTHER INFECTIOUS AND PARASITIC DISEASES: ICD-10-CM

## 2023-11-15 PROCEDURE — 99396 PREV VISIT EST AGE 40-64: CPT

## 2023-11-20 ENCOUNTER — APPOINTMENT (OUTPATIENT)
Dept: CARDIOLOGY | Facility: CLINIC | Age: 63
End: 2023-11-20

## 2023-11-20 LAB
CYTOLOGY CVX/VAG DOC THIN PREP: ABNORMAL
HPV HIGH+LOW RISK DNA PNL CVX: NOT DETECTED

## 2023-11-22 ENCOUNTER — APPOINTMENT (OUTPATIENT)
Dept: OBGYN | Facility: CLINIC | Age: 63
End: 2023-11-22

## 2024-02-05 ENCOUNTER — NON-APPOINTMENT (OUTPATIENT)
Age: 64
End: 2024-02-05

## 2024-02-05 DIAGNOSIS — K52.9 NONINFECTIVE GASTROENTERITIS AND COLITIS, UNSPECIFIED: ICD-10-CM

## 2024-02-05 DIAGNOSIS — R20.0 ANESTHESIA OF SKIN: ICD-10-CM

## 2024-02-05 DIAGNOSIS — Z92.89 PERSONAL HISTORY OF OTHER MEDICAL TREATMENT: ICD-10-CM

## 2024-02-05 DIAGNOSIS — R20.2 ANESTHESIA OF SKIN: ICD-10-CM

## 2024-02-07 ENCOUNTER — APPOINTMENT (OUTPATIENT)
Dept: CARDIOLOGY | Facility: CLINIC | Age: 64
End: 2024-02-07
Payer: COMMERCIAL

## 2024-02-07 ENCOUNTER — NON-APPOINTMENT (OUTPATIENT)
Age: 64
End: 2024-02-07

## 2024-02-07 VITALS
WEIGHT: 156 LBS | HEIGHT: 67 IN | HEART RATE: 85 BPM | OXYGEN SATURATION: 99 % | DIASTOLIC BLOOD PRESSURE: 82 MMHG | BODY MASS INDEX: 24.48 KG/M2 | SYSTOLIC BLOOD PRESSURE: 138 MMHG

## 2024-02-07 DIAGNOSIS — Z86.79 PERSONAL HISTORY OF OTHER DISEASES OF THE CIRCULATORY SYSTEM: ICD-10-CM

## 2024-02-07 DIAGNOSIS — Z87.898 PERSONAL HISTORY OF OTHER SPECIFIED CONDITIONS: ICD-10-CM

## 2024-02-07 DIAGNOSIS — R42 DIZZINESS AND GIDDINESS: ICD-10-CM

## 2024-02-07 DIAGNOSIS — R93.1 ABNORMAL FINDINGS ON DIAGNOSTIC IMAGING OF HEART AND CORONARY CIRCULATION: ICD-10-CM

## 2024-02-07 DIAGNOSIS — I45.9 CONDUCTION DISORDER, UNSPECIFIED: ICD-10-CM

## 2024-02-07 DIAGNOSIS — E78.5 HYPERLIPIDEMIA, UNSPECIFIED: ICD-10-CM

## 2024-02-07 DIAGNOSIS — R55 DIZZINESS AND GIDDINESS: ICD-10-CM

## 2024-02-07 DIAGNOSIS — R00.2 PALPITATIONS: ICD-10-CM

## 2024-02-07 DIAGNOSIS — I34.0 NONRHEUMATIC MITRAL (VALVE) INSUFFICIENCY: ICD-10-CM

## 2024-02-07 PROCEDURE — 93000 ELECTROCARDIOGRAM COMPLETE: CPT

## 2024-02-07 PROCEDURE — 99215 OFFICE O/P EST HI 40 MIN: CPT | Mod: 25

## 2024-02-07 RX ORDER — ATORVASTATIN CALCIUM 20 MG/1
20 TABLET, FILM COATED ORAL DAILY
Qty: 90 | Refills: 3 | Status: ACTIVE | COMMUNITY
Start: 2023-03-16 | End: 1900-01-01

## 2024-02-07 NOTE — CARDIOLOGY SUMMARY
[de-identified] : 2/57/24-nsr [de-identified] : 2018- negative [de-identified] : 2/22/23- ef 63%, mild mr, mild ai [de-identified] : 2/27/23- CTA-zero, minimal mid lad [de-identified] : 3/6/23- no plaque

## 2024-02-07 NOTE — HISTORY OF PRESENT ILLNESS
[FreeTextEntry1] : Ms Crowe was first seen in 2002 for palpitations, has not been seen since 2018..  There have been no hospitalizations since last visit. She continues to have episodes of vertigo.  She has seen ENT and neurology for this. She denies chest pain, dyspnea,  she has palpitations twice a day, a "delay", better since atenolol was discontinued or syncope. Sometimes she feels that cannot get a deep enough breath.  She is active in daily life, she works from home for [employer].

## 2024-09-23 ENCOUNTER — RESULT REVIEW (OUTPATIENT)
Age: 64
End: 2024-09-23

## 2024-10-15 DIAGNOSIS — M81.0 AGE-RELATED OSTEOPOROSIS W/OUT CURRENT PATHOLOGICAL FRACTURE: ICD-10-CM

## 2024-12-02 ENCOUNTER — RESULT REVIEW (OUTPATIENT)
Age: 64
End: 2024-12-02

## 2024-12-12 ENCOUNTER — APPOINTMENT (OUTPATIENT)
Dept: OBGYN | Facility: CLINIC | Age: 64
End: 2024-12-12
Payer: COMMERCIAL

## 2024-12-12 VITALS
WEIGHT: 158 LBS | DIASTOLIC BLOOD PRESSURE: 78 MMHG | BODY MASS INDEX: 24.8 KG/M2 | SYSTOLIC BLOOD PRESSURE: 128 MMHG | HEIGHT: 67 IN

## 2024-12-12 DIAGNOSIS — Z86.19 PERSONAL HISTORY OF OTHER INFECTIOUS AND PARASITIC DISEASES: ICD-10-CM

## 2024-12-12 DIAGNOSIS — Z01.419 ENCOUNTER FOR GYNECOLOGICAL EXAMINATION (GENERAL) (ROUTINE) W/OUT ABNORMAL FINDINGS: ICD-10-CM

## 2024-12-12 PROCEDURE — 99396 PREV VISIT EST AGE 40-64: CPT

## 2024-12-14 ENCOUNTER — LABORATORY RESULT (OUTPATIENT)
Age: 64
End: 2024-12-14

## 2024-12-19 ENCOUNTER — NON-APPOINTMENT (OUTPATIENT)
Age: 64
End: 2024-12-19

## 2024-12-19 ENCOUNTER — APPOINTMENT (OUTPATIENT)
Dept: ENDOCRINOLOGY | Facility: CLINIC | Age: 64
End: 2024-12-19
Payer: COMMERCIAL

## 2024-12-19 VITALS — HEART RATE: 92 BPM | BODY MASS INDEX: 25.58 KG/M2 | WEIGHT: 163 LBS | OXYGEN SATURATION: 100 % | HEIGHT: 67 IN

## 2024-12-19 DIAGNOSIS — E01.0 IODINE-DEFICIENCY RELATED DIFFUSE (ENDEMIC) GOITER: ICD-10-CM

## 2024-12-19 DIAGNOSIS — M81.0 AGE-RELATED OSTEOPOROSIS W/OUT CURRENT PATHOLOGICAL FRACTURE: ICD-10-CM

## 2024-12-19 PROCEDURE — G2211 COMPLEX E/M VISIT ADD ON: CPT | Mod: NC

## 2024-12-19 PROCEDURE — 99215 OFFICE O/P EST HI 40 MIN: CPT

## 2024-12-19 RX ORDER — ALENDRONATE SODIUM 70 MG/1
70 TABLET ORAL
Qty: 12 | Refills: 3 | Status: ACTIVE | COMMUNITY
Start: 2024-12-19 | End: 1900-01-01

## 2024-12-30 ENCOUNTER — APPOINTMENT (OUTPATIENT)
Dept: NEUROLOGY | Facility: CLINIC | Age: 64
End: 2024-12-30

## 2025-02-09 PROBLEM — Z01.419 ENCOUNTER FOR ANNUAL ROUTINE GYNECOLOGICAL EXAMINATION: Status: RESOLVED | Noted: 2024-12-12 | Resolved: 2025-02-09

## 2025-02-09 PROBLEM — Z01.419 ENCOUNTER FOR GYNECOLOGICAL EXAMINATION: Status: RESOLVED | Noted: 2024-12-12 | Resolved: 2025-02-09

## 2025-02-12 ENCOUNTER — NON-APPOINTMENT (OUTPATIENT)
Age: 65
End: 2025-02-12

## 2025-02-12 ENCOUNTER — APPOINTMENT (OUTPATIENT)
Dept: CARDIOLOGY | Facility: CLINIC | Age: 65
End: 2025-02-12
Payer: COMMERCIAL

## 2025-02-12 VITALS — SYSTOLIC BLOOD PRESSURE: 160 MMHG | DIASTOLIC BLOOD PRESSURE: 70 MMHG

## 2025-02-12 VITALS — SYSTOLIC BLOOD PRESSURE: 158 MMHG | DIASTOLIC BLOOD PRESSURE: 70 MMHG

## 2025-02-12 VITALS — DIASTOLIC BLOOD PRESSURE: 98 MMHG | SYSTOLIC BLOOD PRESSURE: 146 MMHG

## 2025-02-12 VITALS — WEIGHT: 163 LBS | HEIGHT: 67 IN | BODY MASS INDEX: 25.58 KG/M2

## 2025-02-12 DIAGNOSIS — R42 DIZZINESS AND GIDDINESS: ICD-10-CM

## 2025-02-12 DIAGNOSIS — I10 ESSENTIAL (PRIMARY) HYPERTENSION: ICD-10-CM

## 2025-02-12 DIAGNOSIS — R93.1 ABNORMAL FINDINGS ON DIAGNOSTIC IMAGING OF HEART AND CORONARY CIRCULATION: ICD-10-CM

## 2025-02-12 DIAGNOSIS — R55 DIZZINESS AND GIDDINESS: ICD-10-CM

## 2025-02-12 DIAGNOSIS — I45.9 CONDUCTION DISORDER, UNSPECIFIED: ICD-10-CM

## 2025-02-12 DIAGNOSIS — E78.5 HYPERLIPIDEMIA, UNSPECIFIED: ICD-10-CM

## 2025-02-12 DIAGNOSIS — R00.2 PALPITATIONS: ICD-10-CM

## 2025-02-12 DIAGNOSIS — I34.0 NONRHEUMATIC MITRAL (VALVE) INSUFFICIENCY: ICD-10-CM

## 2025-02-12 DIAGNOSIS — I49.3 VENTRICULAR PREMATURE DEPOLARIZATION: ICD-10-CM

## 2025-02-12 PROCEDURE — 93000 ELECTROCARDIOGRAM COMPLETE: CPT | Mod: 59

## 2025-02-12 PROCEDURE — 93246 EXT ECG>7D<15D RECORDING: CPT

## 2025-02-12 PROCEDURE — 99215 OFFICE O/P EST HI 40 MIN: CPT

## 2025-02-12 PROCEDURE — G2211 COMPLEX E/M VISIT ADD ON: CPT | Mod: NC

## 2025-02-27 ENCOUNTER — RESULT REVIEW (OUTPATIENT)
Age: 65
End: 2025-02-27

## 2025-03-07 PROCEDURE — 93248 EXT ECG>7D<15D REV&INTERPJ: CPT

## 2025-03-14 ENCOUNTER — APPOINTMENT (OUTPATIENT)
Dept: CARDIOLOGY | Facility: CLINIC | Age: 65
End: 2025-03-14

## 2025-03-17 RX ORDER — METOPROLOL SUCCINATE 25 MG/1
25 TABLET, EXTENDED RELEASE ORAL DAILY
Qty: 45 | Refills: 3 | Status: ACTIVE | COMMUNITY
Start: 2025-03-17 | End: 1900-01-01

## 2025-04-11 ENCOUNTER — APPOINTMENT (OUTPATIENT)
Dept: CARDIOLOGY | Facility: CLINIC | Age: 65
End: 2025-04-11
Payer: COMMERCIAL

## 2025-04-11 ENCOUNTER — NON-APPOINTMENT (OUTPATIENT)
Age: 65
End: 2025-04-11

## 2025-04-11 VITALS — SYSTOLIC BLOOD PRESSURE: 140 MMHG | DIASTOLIC BLOOD PRESSURE: 82 MMHG

## 2025-04-11 VITALS
SYSTOLIC BLOOD PRESSURE: 147 MMHG | HEIGHT: 67 IN | OXYGEN SATURATION: 98 % | DIASTOLIC BLOOD PRESSURE: 91 MMHG | BODY MASS INDEX: 26.37 KG/M2 | HEART RATE: 76 BPM | WEIGHT: 168 LBS

## 2025-04-11 DIAGNOSIS — R93.1 ABNORMAL FINDINGS ON DIAGNOSTIC IMAGING OF HEART AND CORONARY CIRCULATION: ICD-10-CM

## 2025-04-11 DIAGNOSIS — R00.2 PALPITATIONS: ICD-10-CM

## 2025-04-11 DIAGNOSIS — I10 ESSENTIAL (PRIMARY) HYPERTENSION: ICD-10-CM

## 2025-04-11 DIAGNOSIS — E78.5 HYPERLIPIDEMIA, UNSPECIFIED: ICD-10-CM

## 2025-04-11 PROCEDURE — 99214 OFFICE O/P EST MOD 30 MIN: CPT | Mod: 25

## 2025-04-11 PROCEDURE — 93000 ELECTROCARDIOGRAM COMPLETE: CPT

## 2025-04-11 RX ORDER — CALCIUM CARB/VITAMIN D3/VIT K1 650MG-12.5
650-12.5-4 TABLET,CHEWABLE ORAL
Refills: 0 | Status: ACTIVE | COMMUNITY
Start: 2025-04-11

## 2025-06-03 ENCOUNTER — APPOINTMENT (OUTPATIENT)
Dept: ENDOCRINOLOGY | Facility: CLINIC | Age: 65
End: 2025-06-03
Payer: COMMERCIAL

## 2025-06-03 VITALS
WEIGHT: 165 LBS | OXYGEN SATURATION: 99 % | BODY MASS INDEX: 25.9 KG/M2 | HEIGHT: 67 IN | SYSTOLIC BLOOD PRESSURE: 152 MMHG | DIASTOLIC BLOOD PRESSURE: 88 MMHG | HEART RATE: 85 BPM

## 2025-06-03 DIAGNOSIS — E01.0 IODINE-DEFICIENCY RELATED DIFFUSE (ENDEMIC) GOITER: ICD-10-CM

## 2025-06-03 DIAGNOSIS — R79.89 OTHER SPECIFIED ABNORMAL FINDINGS OF BLOOD CHEMISTRY: ICD-10-CM

## 2025-06-03 DIAGNOSIS — M81.0 AGE-RELATED OSTEOPOROSIS W/OUT CURRENT PATHOLOGICAL FRACTURE: ICD-10-CM

## 2025-06-03 LAB
24R-OH-CALCIDIOL SERPL-MCNC: 23.9 PG/ML
25(OH)D3 SERPL-MCNC: 29 NG/ML
ALBUMIN SERPL ELPH-MCNC: 4.3 G/DL
ALP BLD-CCNC: 102 U/L
ALT SERPL-CCNC: 45 U/L
ANION GAP SERPL CALC-SCNC: 11 MMOL/L
AST SERPL-CCNC: 34 U/L
BILIRUB SERPL-MCNC: 0.6 MG/DL
BUN SERPL-MCNC: 18 MG/DL
CALCIUM SERPL-MCNC: 9.6 MG/DL
CHLORIDE SERPL-SCNC: 106 MMOL/L
CO2 SERPL-SCNC: 26 MMOL/L
CREAT SERPL-MCNC: 0.82 MG/DL
EGFRCR SERPLBLD CKD-EPI 2021: 79 ML/MIN/1.73M2
GLUCOSE SERPL-MCNC: 99 MG/DL
MAGNESIUM SERPL-MCNC: 2.3 MG/DL
PHOSPHATE SERPL-MCNC: 3.9 MG/DL
POTASSIUM SERPL-SCNC: 4.6 MMOL/L
PROT SERPL-MCNC: 7.2 G/DL
SODIUM SERPL-SCNC: 143 MMOL/L
TSH SERPL-ACNC: 2.39 UIU/ML

## 2025-06-03 PROCEDURE — 99215 OFFICE O/P EST HI 40 MIN: CPT

## 2025-06-03 PROCEDURE — G2211 COMPLEX E/M VISIT ADD ON: CPT | Mod: NC

## 2025-06-05 LAB — COLLAGEN CTX SERPL-MCNC: 121 PG/ML

## 2025-06-16 ENCOUNTER — APPOINTMENT (OUTPATIENT)
Dept: CARDIOLOGY | Facility: CLINIC | Age: 65
End: 2025-06-16

## 2025-06-17 ENCOUNTER — APPOINTMENT (OUTPATIENT)
Dept: CARDIOLOGY | Facility: CLINIC | Age: 65
End: 2025-06-17

## 2025-06-17 ENCOUNTER — APPOINTMENT (OUTPATIENT)
Dept: CARDIOLOGY | Facility: CLINIC | Age: 65
End: 2025-06-17
Payer: COMMERCIAL

## 2025-06-17 ENCOUNTER — TRANSCRIPTION ENCOUNTER (OUTPATIENT)
Age: 65
End: 2025-06-17

## 2025-06-17 PROCEDURE — 93306 TTE W/DOPPLER COMPLETE: CPT

## 2025-06-30 ENCOUNTER — APPOINTMENT (OUTPATIENT)
Dept: BARIATRICS/WEIGHT MGMT | Facility: CLINIC | Age: 65
End: 2025-06-30
Payer: COMMERCIAL

## 2025-06-30 VITALS — HEIGHT: 67 IN | BODY MASS INDEX: 26.06 KG/M2 | WEIGHT: 166 LBS

## 2025-06-30 PROCEDURE — 97802 MEDICAL NUTRITION INDIV IN: CPT

## 2025-08-28 DIAGNOSIS — Z12.31 ENCOUNTER FOR SCREENING MAMMOGRAM FOR MALIGNANT NEOPLASM OF BREAST: ICD-10-CM
